# Patient Record
Sex: MALE | Race: WHITE
[De-identification: names, ages, dates, MRNs, and addresses within clinical notes are randomized per-mention and may not be internally consistent; named-entity substitution may affect disease eponyms.]

---

## 2017-03-22 ENCOUNTER — HOSPITAL ENCOUNTER (OUTPATIENT)
Dept: HOSPITAL 39 - GMAL | Age: 67
Discharge: HOME | End: 2017-03-22
Attending: FAMILY MEDICINE
Payer: MEDICARE

## 2017-03-22 DIAGNOSIS — M10.9: Primary | ICD-10-CM

## 2017-08-25 ENCOUNTER — HOSPITAL ENCOUNTER (OUTPATIENT)
Dept: HOSPITAL 39 - CT | Age: 67
Discharge: HOME | End: 2017-08-25
Attending: FAMILY MEDICINE
Payer: MEDICARE

## 2017-08-25 DIAGNOSIS — R22.2: Primary | ICD-10-CM

## 2017-08-25 DIAGNOSIS — R09.89: ICD-10-CM

## 2017-08-25 DIAGNOSIS — N28.1: ICD-10-CM

## 2017-08-28 NOTE — CT
EXAM DESCRIPTION: 



CT chest with contrast



CLINICAL HISTORY: 



Localized swelling, mass and lump, trunk



COMPARISON: 



None Available.



TECHNIQUE: 



Contrast-enhanced spiral CT with intravenous iodinated nonionic

contrast. Coronal and sagittal reformatted images.   This exam

was performed according to our departmental dose-optimization

program, which includes automated exposure control, adjustment of

the mA and/or kV according to patient size and/or use of

iterative reconstruction technique.



FINDINGS: 



No pulmonary edema, infiltrate or effusion



No mass or adenopathy in the chest



Normal heart size. Mildly tortuous aorta



Mild fatty infiltration of the liver. 3.8 cm simple cyst left

kidney. No mass or adenopathy otherwise in the upper abdomen



Thoracic spondylosis. No diagnostic acute bony abnormality



IMPRESSION: 



No acute abnormality. No mass or adenopathy seen in the chest



Electronically signed by:  Velasquez Luna MD  8/28/2017 7:59 AM

CDT Workstation: [id number]

## 2017-08-28 NOTE — US
Retroperitoneal abdomen sonogram



CLINICAL HISTORY: Abdominal aortic aneurysm



FINDINGS:



Infrarenal abdominal aortic aneurysm measures up to 4 cm

transverse mid infrarenal aorta on images provided. The images do

not appear accurate. Transverse dimension measurement shows an

odd morphology not typically seen, possibly artifact related to

body habitus



IMPRESSION:



Recommend CT for further evaluation. Atherosclerotic aorta.

Measurements of the aorta by sonogram believed inaccurate because

of body habitus



Electronically signed by:  Velasquez Luna MD  8/28/2017 7:49 AM

CDT Workstation: [id number]

## 2017-08-31 ENCOUNTER — HOSPITAL ENCOUNTER (OUTPATIENT)
Dept: HOSPITAL 39 - CT | Age: 67
Discharge: HOME | End: 2017-08-31
Attending: FAMILY MEDICINE
Payer: MEDICARE

## 2017-08-31 DIAGNOSIS — I71.4: Primary | ICD-10-CM

## 2017-08-31 NOTE — CT
EXAM DESCRIPTION: 



CTA Abdomen



CLINICAL HISTORY: 



AAA



COMPARISON: 



Ultrasound August 25, 2017



TECHNIQUE: 



CTA of the abdomen was performed with IV contrast including 3-D

reformats. This exam was performed according to our departmental

dose-optimization program, which includes automated exposure

control, adjustment of the mA and/or kV according to patient size

and/or use of iterative reconstruction technique.



FINDINGS: 



There is no abdominal aortic aneurysm or dissection. Mild mural

calcifications are noted in the abdominal aorta without luminal

narrowing. There is some calcification at the origin of the

celiac axis without significant luminal narrowing. The superior

mesenteric artery is unremarkable. There is some calcification at

the origin of both renal arteries also without significant

luminal narrowing. The inferior mesenteric artery is perfused. No

common iliac artery aneurysm or stenosis.



There is diffuse fatty infiltration of the liver. There is a 3.9

cm left renal cyst with a few smaller cysts elsewhere in both

kidneys. No concerning bone lesion.



IMPRESSION: 



Mild atherosclerotic vascular disease, but no abdominal aortic

aneurysm.



Diffuse fatty infiltration of the liver and other nonacute

findings as detailed above.



Electronically signed by:  Sandoval Rios MD  8/31/2017 10:21 AM CDT

Workstation: [id number]

## 2018-08-03 ENCOUNTER — HOSPITAL ENCOUNTER (OUTPATIENT)
Dept: HOSPITAL 39 - GMAL | Age: 68
End: 2018-08-03
Attending: FAMILY MEDICINE
Payer: MEDICARE

## 2018-08-03 DIAGNOSIS — E55.9: ICD-10-CM

## 2018-08-03 DIAGNOSIS — R53.83: ICD-10-CM

## 2018-08-03 DIAGNOSIS — D51.8: Primary | ICD-10-CM

## 2019-08-23 ENCOUNTER — HOSPITAL ENCOUNTER (OUTPATIENT)
Dept: HOSPITAL 39 - GMAL | Age: 69
End: 2019-08-23
Attending: FAMILY MEDICINE
Payer: MEDICARE

## 2019-08-23 DIAGNOSIS — Z79.899: ICD-10-CM

## 2019-08-23 DIAGNOSIS — R53.83: ICD-10-CM

## 2019-08-23 DIAGNOSIS — Z12.5: ICD-10-CM

## 2019-08-23 DIAGNOSIS — E78.2: ICD-10-CM

## 2019-08-23 DIAGNOSIS — E55.9: ICD-10-CM

## 2019-08-23 DIAGNOSIS — D51.8: Primary | ICD-10-CM

## 2019-08-23 PROCEDURE — 82306 VITAMIN D 25 HYDROXY: CPT

## 2019-08-23 PROCEDURE — 82607 VITAMIN B-12: CPT

## 2019-08-23 PROCEDURE — 84443 ASSAY THYROID STIM HORMONE: CPT
